# Patient Record
Sex: FEMALE | Race: OTHER | Employment: UNEMPLOYED | URBAN - METROPOLITAN AREA
[De-identification: names, ages, dates, MRNs, and addresses within clinical notes are randomized per-mention and may not be internally consistent; named-entity substitution may affect disease eponyms.]

---

## 2017-10-13 ENCOUNTER — HOSPITAL ENCOUNTER (OUTPATIENT)
Dept: GENERAL RADIOLOGY | Age: 57
Discharge: HOME | End: 2017-10-13
Payer: SUBSIDIZED

## 2017-10-13 DIAGNOSIS — R52 PAIN: ICD-10-CM

## 2017-10-13 PROCEDURE — 73562 X-RAY EXAM OF KNEE 3: CPT

## 2020-01-07 ENCOUNTER — HOSPITAL ENCOUNTER (EMERGENCY)
Age: 60
Discharge: HOME | End: 2020-01-07
Attending: EMERGENCY MEDICINE
Payer: SELF-PAY

## 2020-01-07 ENCOUNTER — APPOINTMENT (OUTPATIENT)
Dept: GENERAL RADIOLOGY | Age: 60
End: 2020-01-07
Attending: EMERGENCY MEDICINE
Payer: SELF-PAY

## 2020-01-07 VITALS
OXYGEN SATURATION: 98 % | TEMPERATURE: 97.7 F | DIASTOLIC BLOOD PRESSURE: 79 MMHG | RESPIRATION RATE: 16 BRPM | SYSTOLIC BLOOD PRESSURE: 139 MMHG | HEIGHT: 60 IN | HEART RATE: 81 BPM

## 2020-01-07 DIAGNOSIS — M54.2 NECK PAIN: Primary | ICD-10-CM

## 2020-01-07 DIAGNOSIS — M25.512 PAIN IN JOINT OF LEFT SHOULDER: ICD-10-CM

## 2020-01-07 DIAGNOSIS — M54.12 CERVICAL RADICULOPATHY: ICD-10-CM

## 2020-01-07 LAB
ALBUMIN SERPL-MCNC: 3.7 G/DL (ref 3.5–5)
ALBUMIN/GLOB SERPL: 1.1 {RATIO} (ref 1.1–2.2)
ALP SERPL-CCNC: 107 U/L (ref 45–117)
ALT SERPL-CCNC: 32 U/L (ref 12–78)
ANION GAP SERPL CALC-SCNC: 4 MMOL/L (ref 5–15)
AST SERPL-CCNC: 17 U/L (ref 15–37)
BASOPHILS # BLD: 0.1 K/UL (ref 0–0.1)
BASOPHILS NFR BLD: 1 % (ref 0–1)
BILIRUB SERPL-MCNC: 0.2 MG/DL (ref 0.2–1)
BUN SERPL-MCNC: 8 MG/DL (ref 6–20)
BUN/CREAT SERPL: 12 (ref 12–20)
CALCIUM SERPL-MCNC: 9.2 MG/DL (ref 8.5–10.1)
CHLORIDE SERPL-SCNC: 109 MMOL/L (ref 97–108)
CO2 SERPL-SCNC: 29 MMOL/L (ref 21–32)
COMMENT, HOLDF: NORMAL
CREAT SERPL-MCNC: 0.65 MG/DL (ref 0.55–1.02)
DIFFERENTIAL METHOD BLD: NORMAL
EOSINOPHIL # BLD: 0.3 K/UL (ref 0–0.4)
EOSINOPHIL NFR BLD: 3 % (ref 0–7)
ERYTHROCYTE [DISTWIDTH] IN BLOOD BY AUTOMATED COUNT: 13.7 % (ref 11.5–14.5)
GLOBULIN SER CALC-MCNC: 3.4 G/DL (ref 2–4)
GLUCOSE SERPL-MCNC: 131 MG/DL (ref 65–100)
HCT VFR BLD AUTO: 37.7 % (ref 35–47)
HGB BLD-MCNC: 12.3 G/DL (ref 11.5–16)
IMM GRANULOCYTES # BLD AUTO: 0 K/UL (ref 0–0.04)
IMM GRANULOCYTES NFR BLD AUTO: 0 % (ref 0–0.5)
LYMPHOCYTES # BLD: 3.2 K/UL (ref 0.8–3.5)
LYMPHOCYTES NFR BLD: 44 % (ref 12–49)
MCH RBC QN AUTO: 28.9 PG (ref 26–34)
MCHC RBC AUTO-ENTMCNC: 32.6 G/DL (ref 30–36.5)
MCV RBC AUTO: 88.7 FL (ref 80–99)
MONOCYTES # BLD: 0.4 K/UL (ref 0–1)
MONOCYTES NFR BLD: 5 % (ref 5–13)
NEUTS SEG # BLD: 3.4 K/UL (ref 1.8–8)
NEUTS SEG NFR BLD: 47 % (ref 32–75)
NRBC # BLD: 0 K/UL (ref 0–0.01)
NRBC BLD-RTO: 0 PER 100 WBC
PLATELET # BLD AUTO: 261 K/UL (ref 150–400)
PMV BLD AUTO: 10.5 FL (ref 8.9–12.9)
POTASSIUM SERPL-SCNC: 3.9 MMOL/L (ref 3.5–5.1)
PROT SERPL-MCNC: 7.1 G/DL (ref 6.4–8.2)
RBC # BLD AUTO: 4.25 M/UL (ref 3.8–5.2)
SAMPLES BEING HELD,HOLD: NORMAL
SODIUM SERPL-SCNC: 142 MMOL/L (ref 136–145)
TROPONIN I SERPL-MCNC: <0.05 NG/ML
WBC # BLD AUTO: 7.3 K/UL (ref 3.6–11)

## 2020-01-07 PROCEDURE — 73030 X-RAY EXAM OF SHOULDER: CPT

## 2020-01-07 PROCEDURE — 80053 COMPREHEN METABOLIC PANEL: CPT

## 2020-01-07 PROCEDURE — 84484 ASSAY OF TROPONIN QUANT: CPT

## 2020-01-07 PROCEDURE — 36415 COLL VENOUS BLD VENIPUNCTURE: CPT

## 2020-01-07 PROCEDURE — 74011250637 HC RX REV CODE- 250/637: Performed by: EMERGENCY MEDICINE

## 2020-01-07 PROCEDURE — 71046 X-RAY EXAM CHEST 2 VIEWS: CPT

## 2020-01-07 PROCEDURE — 99284 EMERGENCY DEPT VISIT MOD MDM: CPT

## 2020-01-07 PROCEDURE — 72050 X-RAY EXAM NECK SPINE 4/5VWS: CPT

## 2020-01-07 PROCEDURE — 93005 ELECTROCARDIOGRAM TRACING: CPT

## 2020-01-07 PROCEDURE — 85025 COMPLETE CBC W/AUTO DIFF WBC: CPT

## 2020-01-07 PROCEDURE — 74011000250 HC RX REV CODE- 250: Performed by: EMERGENCY MEDICINE

## 2020-01-07 RX ORDER — ACETAMINOPHEN 500 MG
1000 TABLET ORAL 3 TIMES DAILY
Qty: 24 TAB | Refills: 0 | Status: SHIPPED | OUTPATIENT
Start: 2020-01-07 | End: 2020-01-11

## 2020-01-07 RX ORDER — LIDOCAINE 4 G/100G
1 PATCH TOPICAL EVERY 24 HOURS
Status: DISCONTINUED | OUTPATIENT
Start: 2020-01-07 | End: 2020-01-08 | Stop reason: HOSPADM

## 2020-01-07 RX ORDER — IBUPROFEN 400 MG/1
400 TABLET ORAL
Status: COMPLETED | OUTPATIENT
Start: 2020-01-07 | End: 2020-01-07

## 2020-01-07 RX ORDER — METHOCARBAMOL 500 MG/1
500 TABLET, FILM COATED ORAL
Qty: 20 TAB | Refills: 0 | Status: SHIPPED | OUTPATIENT
Start: 2020-01-07 | End: 2020-01-12

## 2020-01-07 RX ORDER — LIDOCAINE 50 MG/G
1 PATCH TOPICAL EVERY 24 HOURS
Qty: 5 PATCH | Refills: 0 | Status: SHIPPED | OUTPATIENT
Start: 2020-01-07

## 2020-01-07 RX ORDER — ACETAMINOPHEN 325 MG/1
650 TABLET ORAL
Status: COMPLETED | OUTPATIENT
Start: 2020-01-07 | End: 2020-01-07

## 2020-01-07 RX ADMIN — IBUPROFEN 400 MG: 400 TABLET, FILM COATED ORAL at 23:24

## 2020-01-07 RX ADMIN — ACETAMINOPHEN 650 MG: 325 TABLET ORAL at 23:26

## 2020-01-08 NOTE — ED TRIAGE NOTES
Patient arrives to the ER after being seen at [facility] and being sent to the ER for a further evaluation. Per family patient has been having this pain for 3 days. Patient complaining of left arm pain. Patient denies SOB.

## 2020-01-08 NOTE — ED PROVIDER NOTES
62 y.o. female with past medical history significant for DM, HTN, and hypercholesteremia who presents from home via family members with chief complaint of chest pain. Patient was seen this evening at [facility] for chest pain x 3 days, and was then sent here for further evaluation. Patient complains of chest pain, left shoulder pain, neck pain, and left arm pain at this time. Patient states the pain radiates from her neck, to her arm. Per son, the patient has a hx of arthritis in her left arm and shoulder. Patient states the pain gets better with over the counter \"cream\" applied to her arm. Patient notes she had a stress test completed 3 years ago that was normal. Patient took 650mg of tylenol at 1500. Patient denies any hx of heart problems. Patient also denies any SOB, or nausea. There are no other acute medical concerns at this time. Social hx: (-) Smoker     Note written by Jani Carcamo, as dictated by Ernestine Arreola MD 10:52 PM      The history is provided by the patient. No  was used. No past medical history on file. No past surgical history on file. No family history on file.     Social History     Socioeconomic History    Marital status: SINGLE     Spouse name: Not on file    Number of children: Not on file    Years of education: Not on file    Highest education level: Not on file   Occupational History    Not on file   Social Needs    Financial resource strain: Not on file    Food insecurity:     Worry: Not on file     Inability: Not on file    Transportation needs:     Medical: Not on file     Non-medical: Not on file   Tobacco Use    Smoking status: Not on file   Substance and Sexual Activity    Alcohol use: Not on file    Drug use: Not on file    Sexual activity: Not on file   Lifestyle    Physical activity:     Days per week: Not on file     Minutes per session: Not on file    Stress: Not on file   Relationships    Social connections:     Talks on phone: Not on file     Gets together: Not on file     Attends Anglican service: Not on file     Active member of club or organization: Not on file     Attends meetings of clubs or organizations: Not on file     Relationship status: Not on file    Intimate partner violence:     Fear of current or ex partner: Not on file     Emotionally abused: Not on file     Physically abused: Not on file     Forced sexual activity: Not on file   Other Topics Concern    Not on file   Social History Narrative    Not on file         ALLERGIES: Patient has no known allergies. Review of Systems   Constitutional: Negative for chills and fever. HENT: Negative for congestion and sore throat. Eyes: Negative for pain. Respiratory: Negative for shortness of breath. Cardiovascular: Positive for chest pain. Gastrointestinal: Negative for abdominal pain, diarrhea, nausea and vomiting. Genitourinary: Negative for dysuria and flank pain. Musculoskeletal: Positive for arthralgias, myalgias and neck pain. Negative for back pain. Skin: Negative for rash. Neurological: Negative for dizziness and headaches. Vitals:    01/07/20 2217   BP: 139/79   Pulse: 81   Resp: 16   Temp: 97.7 °F (36.5 °C)   SpO2: 98%   Height: 5' (1.524 m)            Physical Exam  Constitutional:       Appearance: She is well-developed. HENT:      Head: Normocephalic. Eyes:      Conjunctiva/sclera: Conjunctivae normal.   Neck:      Musculoskeletal: Normal range of motion and neck supple. Cardiovascular:      Rate and Rhythm: Normal rate and regular rhythm. Pulses: Normal pulses. Pulmonary:      Effort: Pulmonary effort is normal. No respiratory distress. Breath sounds: Normal breath sounds. Abdominal:      General: Bowel sounds are normal.      Palpations: Abdomen is soft. Tenderness: There is no tenderness. Musculoskeletal: Normal range of motion.       Comments: Pain to the mid scapula and cervical spine, normal strength    Skin:     General: Skin is warm. Capillary Refill: Capillary refill takes less than 2 seconds. Findings: No rash. Neurological:      Mental Status: She is alert and oriented to person, place, and time. Comments: No gross motor or sensory deficits      Note written by Jani Hurley, as dictated by Tricia Ricks MD 10:52 PM      MDM  Number of Diagnoses or Management Options  Cervical radiculopathy:   Neck pain:   Pain in joint of left shoulder:   Diagnosis management comments: Patient presenting with left-sided neck pain/shoulder pain that radiates down the left arm has mild pain in the left upper chest.  Patient reports symptoms have been constant for 3 to 4 days. Denies any trauma or injuries. EKG unremarkable and troponin negative. Unlikely ACS. Chest x-ray negative signs of degenerative changes in the cervical spine. Patient symptoms seem most consistent with a cervical radiculopathy given symptomatic treatment and referral to orthopedics. Amount and/or Complexity of Data Reviewed  Clinical lab tests: reviewed  Tests in the radiology section of CPT®: reviewed      ED Course as of Jan 07 2252 Tue Jan 07, 2020 2252 10:52 PM  EKG: NSR. Rate 67. Normal intervals, normal axis, no ST-elevations or depressions. No signs of ischemia. Interpreted by Fauzia Greene MD          [ZD]      ED Course User Index  [ZD] Tricia Ricks MD       Procedures    Recent Results (from the past 24 hour(s))   SAMPLES BEING HELD    Collection Time: 01/07/20 10:08 PM   Result Value Ref Range    SAMPLES BEING HELD 1SST,1RED,1DRK GRN,1BLU     COMMENT        Add-on orders for these samples will be processed based on acceptable specimen integrity and analyte stability, which may vary by analyte.    CBC WITH AUTOMATED DIFF    Collection Time: 01/07/20 10:08 PM   Result Value Ref Range    WBC 7.3 3.6 - 11.0 K/uL    RBC 4.25 3.80 - 5.20 M/uL    HGB 12.3 11.5 - 16.0 g/dL    HCT 37.7 35.0 - 47.0 %    MCV 88.7 80.0 - 99.0 FL    MCH 28.9 26.0 - 34.0 PG    MCHC 32.6 30.0 - 36.5 g/dL    RDW 13.7 11.5 - 14.5 %    PLATELET 407 070 - 716 K/uL    MPV 10.5 8.9 - 12.9 FL    NRBC 0.0 0  WBC    ABSOLUTE NRBC 0.00 0.00 - 0.01 K/uL    NEUTROPHILS 47 32 - 75 %    LYMPHOCYTES 44 12 - 49 %    MONOCYTES 5 5 - 13 %    EOSINOPHILS 3 0 - 7 %    BASOPHILS 1 0 - 1 %    IMMATURE GRANULOCYTES 0 0.0 - 0.5 %    ABS. NEUTROPHILS 3.4 1.8 - 8.0 K/UL    ABS. LYMPHOCYTES 3.2 0.8 - 3.5 K/UL    ABS. MONOCYTES 0.4 0.0 - 1.0 K/UL    ABS. EOSINOPHILS 0.3 0.0 - 0.4 K/UL    ABS. BASOPHILS 0.1 0.0 - 0.1 K/UL    ABS. IMM. GRANS. 0.0 0.00 - 0.04 K/UL    DF AUTOMATED     METABOLIC PANEL, COMPREHENSIVE    Collection Time: 01/07/20 10:08 PM   Result Value Ref Range    Sodium 142 136 - 145 mmol/L    Potassium 3.9 3.5 - 5.1 mmol/L    Chloride 109 (H) 97 - 108 mmol/L    CO2 29 21 - 32 mmol/L    Anion gap 4 (L) 5 - 15 mmol/L    Glucose 131 (H) 65 - 100 mg/dL    BUN 8 6 - 20 MG/DL    Creatinine 0.65 0.55 - 1.02 MG/DL    BUN/Creatinine ratio 12 12 - 20      GFR est AA >60 >60 ml/min/1.73m2    GFR est non-AA >60 >60 ml/min/1.73m2    Calcium 9.2 8.5 - 10.1 MG/DL    Bilirubin, total 0.2 0.2 - 1.0 MG/DL    ALT (SGPT) 32 12 - 78 U/L    AST (SGOT) 17 15 - 37 U/L    Alk. phosphatase 107 45 - 117 U/L    Protein, total 7.1 6.4 - 8.2 g/dL    Albumin 3.7 3.5 - 5.0 g/dL    Globulin 3.4 2.0 - 4.0 g/dL    A-G Ratio 1.1 1.1 - 2.2     TROPONIN I    Collection Time: 01/07/20 10:08 PM   Result Value Ref Range    Troponin-I, Qt. <0.05 <0.05 ng/mL       Xr Chest Pa Lat    Result Date: 1/7/2020  History: Chest pain. Frontal and lateral views of the chest show clear lungs. There is atherosclerosis of the aorta. The heart, mediastinum and pulmonary vasculature are normal.  There are mild degenerative changes of the spine. There is an old left rib fracture. IMPRESSION: No acute findings.      Xr Spine Cerv 4 Or 5 V    Result Date: 1/7/2020  History: Pain. AP, lateral, swimmer's, bilateral oblique, and odontoid radiographs of the cervical spine demonstrate cervical spine straightening. There is disc space narrowing from C5 through C7. There is uncinate process hypertrophy with narrowing of the right C3-4 and C4-5 neural foramen. The vertebral body heights are maintained. The prevertebral soft tissues appear normal.     IMPRESSION: Multilevel degenerative changes. No acute findings. Xr Shoulder Lt Ap/lat Min 2 V    Result Date: 1/7/2020  EXAM: XR SHOULDER LT AP/LAT MIN 2 V INDICATION: pain. Left shoulder pain COMPARISON: None. FINDINGS: Three views of the left shoulder demonstrate no fracture, dislocation or other acute abnormality. There are degenerative changes of the glenohumeral joint. IMPRESSION: No acute abnormality.

## 2020-01-08 NOTE — DISCHARGE INSTRUCTIONS
Patient Education        Pinzamiento de un nervio en el deborah: Instrucciones de cuidado - [ Salena Minus Nerve in the Neck: Care Instructions ]  Instrucciones de cuidado  Un pinzamiento de un nervio en el deborah ocurre cuando se daña Cory Garth o un disco en la parte superior de la columna vertebral. Kendra daño puede ser consecuencia de usman [place]. O simplemente puede suceder con la edad. Los cambios causados por el daño pueden ejercer presión en [facility] raíz nerviosa cercana y oprimirla. [place] provoca síntomas juan daniel dolor [employer] deborah, el hombro, el brazo, la mano o la espalda. También podría causar hormigueo o entumecimiento. A veces causa debilidad en el brazo. Los síntomas suelen ser peores cuando se gira la mis o se tensiona el deborah. Para [facility], los síntomas mejoran con el tiempo y finalmente desaparecen. El tratamiento temprano suele incluir medicamentos para el dolor y la hinchazón. A veces, la fisioterapia y ejercicios especiales pueden ayudar. La atención de seguimiento es usman parte clave de teran tratamiento y seguridad. Asegúrese de hacer y acudir a todas las citas, y llame a teran médico si está teniendo problemas. También es usman buena idea saber los resultados de polly exámenes y mantener usman lista de los medicamentos que rajendra. ¿Cómo puede cuidarse en el hogar? · Sea giovanni con los medicamentos. Adrienne y siga todas las instrucciones de la [place]. ¨ Si el médico le recetó un analgésico (medicamento para el dolor), tómelo juan daniel se lo indicó. ¨ Si no está tomando un analgésico recetado, pregúntele a teran médico si puede lisseth un medicamento de [facility]. · Trate de utilizar usman almohadilla térmica a baja o media temperatura por entre 15 y 20 minutos cada 2 o 3 horas. Pruebe con [place] ducha tibia en vez de usman sesión con la almohadilla térmica. También puede comprar envolturas calientes (\"heat wraps\") desechables que caballero hasta 8 horas.   · También puede probar con [facility] compresa de hielo por [place] 10 y 15 minutos cada 2 o 3 horas. No hay usman evidencia sólida de que el calor o el hielo ayuden. Galdino usted puede probarlos para alex si le ayudan. · No pase demasiado tiempo en usman chelsea posición. [place] descansos breves para pasear y cambiar la postura. · Use un cinturón de seguridad y un arnés de hombro cuando está en un auto. · Duerma con usman almohada bajo la mis y el deborah que le mantenga el deborah derecho. · Si le dieron un deborah ortopédico (collarín cervical) para restringir la movilidad del deborah, úselo juan daniel se lo indicaron por todos los días que le diga el médico. No lo use carleen más tiempo de lo que le indicaron. Usar un deborah ortopédico por demasiado tiempo puede causar rigidez en el deborah y puede debilitar los músculos del deborah. · Siga las indicaciones de teran médico para hacer ejercicios suaves de estiramiento del deborah. · No fume. Fumar puede retrasar la sanación de [facility] [employer]. Si necesita ayuda para dejar el hábito, hable con teran médico sobre programas y medicamentos para dejar de fumar. Estos pueden aumentar polly probabilidades de dejar de fumar para siempre. · Evite hacer trabajos y ejercicios intensos hasta que teran médico lo apruebe. ¿Cuándo debe pedir ayuda? Llame al 911 en cualquier momento que considere que necesita atención de [place]. Por ejemplo, llame si:  ? · Es completamente incapaz de  un brazo o usman pierna. ?Llame a teran médico ahora mismo o busque atención médica inmediata si:  ? · Usted tiene síntomas nuevos o peores en los brazos, las piernas, el pecho, el abdomen o las nalgas (glúteos). Los síntomas pueden incluir:  ¨ Entumecimiento u hormigueo. ¨ Debilidad. ¨ Dolor. ? · Pierde el control de la vejiga o del intestino. ?Preste especial atención a los cambios en teran santos y asegúrese de comunicarse con teran médico si:  ? · No mejora juan daniel se esperaba. ¿Dónde puede encontrar más información en inglés? Ubaldo Rojas a [email or web address]-dari.[email or web address]   Edu Whaley U901 en la búsqueda para aprender más acerca de \"Pinzamiento de un nervio en el deborah: Instrucciones de cuidado - [ Pinched Nerve in the Neck: Care Instructions ]. \"  Revisado: 21 marzo, 2017  Versión del contenido: 11.5  © 2579-2179 Healthwise, Incorporated. Las instrucciones de cuidado fueron adaptadas bajo licencia por [employer] (which disclaims liability or warranty for this information). Si usted tiene [place] afección médica o sobre estas instrucciones, siempre pregunte a teran profesional de santos. Healthwise, Incorporated niega toda garantía o responsabilidad por teran uso de esta información.

## 2020-01-09 LAB
ATRIAL RATE: 67 BPM
CALCULATED P AXIS, ECG09: 32 DEGREES
CALCULATED R AXIS, ECG10: 50 DEGREES
CALCULATED T AXIS, ECG11: 30 DEGREES
DIAGNOSIS, 93000: NORMAL
P-R INTERVAL, ECG05: 144 MS
Q-T INTERVAL, ECG07: 404 MS
QRS DURATION, ECG06: 76 MS
QTC CALCULATION (BEZET), ECG08: 426 MS
VENTRICULAR RATE, ECG03: 67 BPM

## 2020-01-17 ENCOUNTER — HOSPITAL ENCOUNTER (OUTPATIENT)
Dept: LAB | Age: 60
Discharge: HOME | End: 2020-01-17

## 2020-01-17 PROCEDURE — 87086 URINE CULTURE/COLONY COUNT: CPT

## 2020-01-19 LAB
BACTERIA SPEC CULT: NORMAL
SERVICE CMNT-IMP: NORMAL

## 2020-08-20 ENCOUNTER — HOSPITAL ENCOUNTER (OUTPATIENT)
Dept: LAB | Age: 60
Discharge: HOME | End: 2020-08-20

## 2020-08-20 PROCEDURE — 88175 CYTOPATH C/V AUTO FLUID REDO: CPT

## 2020-08-20 PROCEDURE — 87624 HPV HI-RISK TYP POOLED RSLT: CPT

## 2021-05-28 ENCOUNTER — HOSPITAL ENCOUNTER (OUTPATIENT)
Dept: LAB | Age: 61
Discharge: HOME | End: 2021-05-28

## 2021-05-28 PROCEDURE — 87086 URINE CULTURE/COLONY COUNT: CPT

## 2021-05-30 LAB
BACTERIA SPEC CULT: NORMAL
SERVICE CMNT-IMP: NORMAL

## 2023-10-27 ENCOUNTER — HOSPITAL ENCOUNTER (OUTPATIENT)
Facility: HOSPITAL | Age: 63
Setting detail: SPECIMEN
Discharge: HOME | End: 2023-10-30

## 2023-10-27 PROCEDURE — 87624 HPV HI-RISK TYP POOLED RSLT: CPT

## 2023-10-27 PROCEDURE — 88175 CYTOPATH C/V AUTO FLUID REDO: CPT

## 2024-08-09 ENCOUNTER — HOSPITAL ENCOUNTER (OUTPATIENT)
Facility: HOSPITAL | Age: 64
Setting detail: SPECIMEN
Discharge: HOME | End: 2024-08-12

## 2024-08-09 PROCEDURE — 82570 ASSAY OF URINE CREATININE: CPT

## 2024-08-09 PROCEDURE — 82043 UR ALBUMIN QUANTITATIVE: CPT

## 2024-08-10 LAB
CREAT UR-MCNC: 27.4 MG/DL
MICROALBUMIN UR-MCNC: <0.5 MG/DL
MICROALBUMIN/CREAT UR-RTO: <18 MG/G (ref 0–30)

## 2024-10-16 ENCOUNTER — HOSPITAL ENCOUNTER (OUTPATIENT)
Facility: HOSPITAL | Age: 64
Setting detail: OUTPATIENT SURGERY
Discharge: HOME | End: 2024-10-16
Attending: INTERNAL MEDICINE | Admitting: INTERNAL MEDICINE

## 2024-10-16 ENCOUNTER — ANESTHESIA EVENT (OUTPATIENT)
Facility: HOSPITAL | Age: 64
End: 2024-10-16

## 2024-10-16 ENCOUNTER — ANESTHESIA (OUTPATIENT)
Facility: HOSPITAL | Age: 64
End: 2024-10-16

## 2024-10-16 VITALS
RESPIRATION RATE: 15 BRPM | HEIGHT: 63 IN | WEIGHT: 154.98 LBS | SYSTOLIC BLOOD PRESSURE: 95 MMHG | TEMPERATURE: 98.6 F | DIASTOLIC BLOOD PRESSURE: 49 MMHG | HEART RATE: 61 BPM | BODY MASS INDEX: 27.46 KG/M2 | OXYGEN SATURATION: 100 %

## 2024-10-16 LAB
GLUCOSE BLD STRIP.AUTO-MCNC: 170 MG/DL (ref 65–117)
SERVICE CMNT-IMP: ABNORMAL

## 2024-10-16 PROCEDURE — 7100000010 HC PHASE II RECOVERY - FIRST 15 MIN: Performed by: INTERNAL MEDICINE

## 2024-10-16 PROCEDURE — 6360000002 HC RX W HCPCS: Performed by: NURSE ANESTHETIST, CERTIFIED REGISTERED

## 2024-10-16 PROCEDURE — 2709999900 HC NON-CHARGEABLE SUPPLY: Performed by: INTERNAL MEDICINE

## 2024-10-16 PROCEDURE — 3600007512: Performed by: INTERNAL MEDICINE

## 2024-10-16 PROCEDURE — 3700000000 HC ANESTHESIA ATTENDED CARE: Performed by: INTERNAL MEDICINE

## 2024-10-16 PROCEDURE — 3600007502: Performed by: INTERNAL MEDICINE

## 2024-10-16 PROCEDURE — 88342 IMHCHEM/IMCYTCHM 1ST ANTB: CPT

## 2024-10-16 PROCEDURE — 3700000001 HC ADD 15 MINUTES (ANESTHESIA): Performed by: INTERNAL MEDICINE

## 2024-10-16 PROCEDURE — 82962 GLUCOSE BLOOD TEST: CPT

## 2024-10-16 PROCEDURE — 7100000011 HC PHASE II RECOVERY - ADDTL 15 MIN: Performed by: INTERNAL MEDICINE

## 2024-10-16 PROCEDURE — 88305 TISSUE EXAM BY PATHOLOGIST: CPT

## 2024-10-16 RX ORDER — SODIUM CHLORIDE 0.9 % (FLUSH) 0.9 %
5-40 SYRINGE (ML) INJECTION EVERY 12 HOURS SCHEDULED
Status: DISCONTINUED | OUTPATIENT
Start: 2024-10-16 | End: 2024-10-16 | Stop reason: HOSPADM

## 2024-10-16 RX ORDER — CANAGLIFLOZIN 100 MG/1
TABLET, FILM COATED ORAL
COMMUNITY
Start: 2024-05-09 | End: 2025-02-05

## 2024-10-16 RX ORDER — LIDOCAINE HCL/PF 100 MG/5ML
SYRINGE (ML) INJECTION
Status: DISCONTINUED | OUTPATIENT
Start: 2024-10-16 | End: 2024-10-16 | Stop reason: SDUPTHER

## 2024-10-16 RX ORDER — ATORVASTATIN CALCIUM 10 MG/1
1 TABLET, FILM COATED ORAL
COMMUNITY

## 2024-10-16 RX ORDER — SODIUM CHLORIDE 0.9 % (FLUSH) 0.9 %
5-40 SYRINGE (ML) INJECTION PRN
Status: DISCONTINUED | OUTPATIENT
Start: 2024-10-16 | End: 2024-10-16 | Stop reason: HOSPADM

## 2024-10-16 RX ORDER — LISINOPRIL 10 MG/1
1 TABLET ORAL
COMMUNITY

## 2024-10-16 RX ORDER — PHENYLEPHRINE HYDROCHLORIDE 10 MG/ML
INJECTION INTRAVENOUS
Status: DISCONTINUED | OUTPATIENT
Start: 2024-10-16 | End: 2024-10-16 | Stop reason: SDUPTHER

## 2024-10-16 RX ORDER — METOCLOPRAMIDE 10 MG/1
10 TABLET ORAL
Status: ON HOLD | COMMUNITY
End: 2024-10-16

## 2024-10-16 RX ORDER — SODIUM CHLORIDE 9 MG/ML
INJECTION, SOLUTION INTRAVENOUS PRN
Status: DISCONTINUED | OUTPATIENT
Start: 2024-10-16 | End: 2024-10-16 | Stop reason: HOSPADM

## 2024-10-16 RX ORDER — ASPIRIN 81 MG/1
81 TABLET ORAL DAILY
COMMUNITY

## 2024-10-16 RX ORDER — PROPOFOL 10 MG/ML
INJECTION, EMULSION INTRAVENOUS
Status: DISCONTINUED | OUTPATIENT
Start: 2024-10-16 | End: 2024-10-16 | Stop reason: SDUPTHER

## 2024-10-16 RX ADMIN — LIDOCAINE HYDROCHLORIDE 50 MG: 20 INJECTION INTRAVENOUS at 14:24

## 2024-10-16 RX ADMIN — PHENYLEPHRINE HYDROCHLORIDE 80 MCG: 10 INJECTION INTRAVENOUS at 14:28

## 2024-10-16 RX ADMIN — PROPOFOL 50 MG: 10 INJECTION, EMULSION INTRAVENOUS at 14:24

## 2024-10-16 RX ADMIN — PROPOFOL 150 MCG/KG/MIN: 10 INJECTION, EMULSION INTRAVENOUS at 14:30

## 2024-10-16 RX ADMIN — PHENYLEPHRINE HYDROCHLORIDE 160 MCG: 10 INJECTION INTRAVENOUS at 14:32

## 2024-10-16 RX ADMIN — PHENYLEPHRINE HYDROCHLORIDE 80 MCG: 10 INJECTION INTRAVENOUS at 14:36

## 2024-10-16 ASSESSMENT — PAIN SCALES - GENERAL
PAINLEVEL_OUTOF10: 0

## 2024-10-16 ASSESSMENT — PAIN - FUNCTIONAL ASSESSMENT: PAIN_FUNCTIONAL_ASSESSMENT: 0-10

## 2024-10-16 NOTE — ANESTHESIA POSTPROCEDURE EVALUATION
Department of Anesthesiology  Postprocedure Note    Patient: Taylor Rhodes  MRN: [id number]  YOB: 1960  Date of evaluation: 10/16/2024    Procedure Summary       Date: 10/16/24 Room / Location: [place] [address] / [facility] ENDOSCOPY    Anesthesia Start: 1419 Anesthesia Stop: 1444    Procedures:       COLONOSCOPY DIAGNOSTIC (Lower GI Region)      ESOPHAGOGASTRODUODENOSCOPY (Upper GI Region)      ESOPHAGOGASTRODUODENOSCOPY BIOPSY (Upper GI Region) Diagnosis:       Colon cancer screening      Epigastric pain      (Colon cancer screening [Z12.11])      (Epigastric pain [R10.13])    Surgeons: Yferi Hernandez MD Responsible Provider: Roxie Cardoza MD    Anesthesia Type: MAC ASA Status: 2            Anesthesia Type: No value filed.    Ajit Phase I: Ajit Score: 10    Ajit Phase II: Ajit Score: 10    Anesthesia Post Evaluation    No notable events documented.

## 2024-10-16 NOTE — ANESTHESIA PRE PROCEDURE
Department of Anesthesiology  Preprocedure Note       Name:  Taylor Rhodes   Age:  63 y.o.  :  1960                                          MRN:  [id number]         Date:  10/16/2024      Surgeon: Surgeon(s):  Yefri Hernandez MD    Procedure: Procedure(s):  COLONOSCOPY DIAGNOSTIC  ESOPHAGOGASTRODUODENOSCOPY    Medications prior to admission:   Prior to Admission medications    Medication Sig Start Date End Date Taking? Authorizing Provider   atorvastatin (LIPITOR) 10 MG tablet Take 1 tablet by mouth Every Day   Yes Giovanni Pearson MD   lisinopril (PRINIVIL;ZESTRIL) 10 MG tablet Take 1 tablet by mouth Every Day   Yes Giovanni Pearson MD   metFORMIN (GLUCOPHAGE) 1000 MG tablet 1 tablet   Yes Giovanni Pearson MD   canagliflozin (INVOKANA) 100 MG TABS tablet 1 tablet before the first meal of the day Orally Once a day for 90 days 24 Yes Giovanni Pearson MD   insulin detemir (LEVEMIR) 100 UNIT/ML injection vial Inject into the skin nightly   Yes Giovanni Pearson MD   aspirin 81 MG EC tablet Take 1 tablet by mouth daily   Yes Giovanni Pearson MD   Dulaglutide (TRULICITY) 4.5 MG/0.5ML SOPN 1 injection Subcutaneous weekly for 120 days  25 Yes Giovanni Pearson MD       Current medications:    Current Facility-Administered Medications   Medication Dose Route Frequency Provider Last Rate Last Admin    sodium chloride flush 0.9 % injection 5-40 mL  5-40 mL IntraVENous 2 times per day Yefri Hernandez MD        sodium chloride flush 0.9 % injection 5-40 mL  5-40 mL IntraVENous PRN Yefri Hernandez MD        0.9 % sodium chloride infusion   IntraVENous PRN Yefri Hernandez MD           Allergies:  No Known Allergies    Problem List:  There is no problem list on file for this patient.      Past Medical History:        Diagnosis Date    Depression     Diabetes (HCC)     High cholesterol     Hypertension        Past Surgical History:  History reviewed. No pertinent

## 2024-10-16 NOTE — DISCHARGE INSTRUCTIONS
[facility]  Yefri Hernandez M.D.  [phone]                 COLON and EGD DISCHARGE INSTRUCTIONS    10/16/2024    Taylor Rhodes  :  1960  [facility] Medical Record Number:  [id number]      DISCOMFORT:  Sore throat- throat lozenges or warm salt water gargle  Redness at IV site- apply warm compress to area; if redness or soreness persist- contact your physician  There may be a slight amount of blood passed from the rectum  Gaseous discomfort- walking, belching will help relieve any discomfort  You may not operate a vehicle for 12 hours  You may not engage in an occupation involving machinery or appliances for rest of today  You may not drink alcoholic beverages for at least 12 hours  Avoid making any critical decisions for at least 24 hour  DIET:   Regular diet   - however -  remember your colon is empty and a heavy meal will produce gas.   Avoid these foods:  vegetables, fried / greasy foods, carbonated drinks for today     ACTIVITY:  You may  resume your normal daily activities it is recommended that you spend the remainder of the day resting -  avoid any strenuous activity and driving.    CALL M.DMichelle  ANY SIGN OF:   Increasing pain, nausea, vomiting  Abdominal distension (swelling)  New increased bleeding (oral or rectal)  Fever (chills)  Pain in chest area  Bloody discharge from nose or mouth  Shortness of breath      Follow-up Instructions:   Call Dr. Hernandez if any questions at [phone].  Results of procedure / biopsy in 7 to 10 days, we will call you with these results.  Your endoscopy showed slightly irregular GE junction otherwise unremarkable   Your colonoscopy showed normal colonoscopy.  Repeat colonoscopy in 10 years

## 2024-10-16 NOTE — PROGRESS NOTES
Received recovery report from anesthesia team, see anesthesia note. Abdomen remains soft and non-tender post-procedure. Pt has no complaints at this time and tolerated procedure well. Endoscope was pre-cleaned at the bedside by Tristan Chapman immediately following procedure. Post recovery report given to Aviva Luna.

## 2024-10-16 NOTE — H&P
[facility]  Yefri Hrenandez M.D.  [phone]            History and Physical       NAME:  Taylor Rhodes   :   1960   MRN:   [id number]       Referring Physician:  Rodger Garcia MD      Consult Date: 10/16/2024 1:04 PM    Chief Complaint:  epigastric pain and Colon cancer screening    History of Present Illness:       Taylor Rhodes  Appointment: 2024 10:00 AM  Location: [facility]  Patient #: [id number]  : 1960  Unknown / Language: Other / Race: Refused to Report/Unreported  Female   History of Present Illness (Yefri Hernandez MD; 2024 10:25 AM)   The patient is a 63 year old female who presents with a complaint of Gastritis. Note for \"Gastritis\": Ms. Wiseman is a 63-year-old lady who is being seen today for gastritis    I have reviewed her records.  She has a history of diabetes    States has been having epigastric pain for about 6 months  eating greasy foods will make her pain worse  no imaging has been done  if she avoids greasy foods then her pain is better    denies any nausea or vomiting         PMH:  Past Medical History:   Diagnosis Date    Depression     Diabetes (HCC)     High cholesterol     Hypertension        PSH:  History reviewed. No pertinent surgical history.    Allergies:  No Known Allergies    Home Medications:  Prior to Admission Medications   Prescriptions Last Dose Informant Patient Reported? Taking?   Dulaglutide (TRULICITY) 4.5 MG/0.5ML SOPN 2024  Yes Yes   Si injection Subcutaneous weekly for 120 days   atorvastatin (LIPITOR) 10 MG tablet 10/15/2024  Yes Yes   Sig: Take 1 tablet by mouth Every Day   canagliflozin (INVOKANA) 100 MG TABS tablet 10/15/2024  Yes Yes   Si tablet before the first meal of the day Orally Once a day for 90 days   lisinopril (PRINIVIL;ZESTRIL) 10 MG tablet 10/16/2024  Yes Yes   Sig: Take 1 tablet by mouth Every Day   metFORMIN (GLUCOPHAGE) 1000 MG tablet 10/16/2024  Yes Yes

## 2024-10-16 NOTE — PROCEDURES
[facility]  Yefri Hernandez M.D.  [phone]           10/16/2024                EGD Operative Report  Taylor Rhodes  :  1960  [facility] Medical Record Number:  [id number]      Indication: Epigastric pain    : Yefri Hernandez MD    Assistant -- None  Implants -- None    Referring Provider:  Rodger Garcia MD      Anesthesia/Sedation:  MAC anesthesia Propofol    Airway assessment: No airway problems anticipated    Pre-Procedural Exam:      Airway: clear, no airway problems anticipated  Heart: RRR, without gallops or rubs  Lungs: clear bilaterally without wheezes, crackles, or rhonchi  Abdomen: soft, nontender, nondistended, bowel sounds present  Mental Status: awake, alert and oriented to person, place and time       Procedure Details     After infomed consent was obtained for the procedure, with all risks and benefits of procedure explained the patient was taken to the endoscopy suite and placed in the left lateral decubitus position.  Following sequential administration of sedation as per above, the endoscope was inserted into the mouth and advanced under direct vision to second portion of the duodenum.  A careful inspection was made as the gastroscope was withdrawn, including a retroflexed view of the proximal stomach; findings and interventions are described below.      Findings:   Esophagus:normal other than a slightly irregular Z-line  Stomach: normal   Duodenum/jejunum: normal    Therapies:  biopsy of stomach  rule out H. pylori  biopsy of GE junction    Specimens:  As above           Complications:   None; patient tolerated the procedure well.    EBL:  None.           Impression:   Slight irregularity of the GE junction otherwise unremarkable    Recommendations:    -Await pathology.  -Proceed with colonoscopy today as planned    Yefri Hernandez MD

## 2024-10-16 NOTE — PROCEDURES
Former[facility]  Yefri Hernandez M.D.  [phone]            10/16/2024          Colonoscopy Operative Report  Taylor Rhodes  :  1960  [facility] Medical Record Number:  [id number]      Indications:    Screening colonoscopy     :  Yefri Hernandez MD    Assistant -- None  Implants -- None    Referring Provider: Rodger Garcia MD    Sedation:  MAC anesthesia Propofol    Pre-Procedural Exam:      Airway: clear,  No airway problems anticipated  Heart: RRR, without gallops or rubs  Lungs: clear bilaterally without wheezes, crackles, or rhonchi  Abdomen: soft, nontender, nondistended, bowel sounds present  Mental Status: awake, alert and oriented to person, place and time     Procedure Details:  After informed consent was obtained with all risks and benefits of procedure explained and preoperative exam completed, the patient was taken to the endoscopy suite and placed in the left lateral decubitus position.  Upon sequential sedation as per above, a digital rectal exam was performed. The Olympus videocolonoscope  was inserted in the rectum and carefully advanced to the terminal ileum.  The quality of preparation was good.  The colonoscope was slowly withdrawn with careful inspection and evaluation between folds. Retroflexion in the rectum was performed.    Findings:   Terminal Ileum: normal  Cecum: normal  Ascending Colon: normal  Transverse Colon: normal  Descending Colon: normal  Sigmoid: normal  Rectum: normal    Interventions:  none    Specimen Removed:  none    Complications: None.     EBL:  None.    Impression: Normal exam    Recommendations:  -Repeat colonoscopy in 10 years     Discharge Disposition:  Home in the company of a  when able to ambulate.    Yefri Hernandez MD  10/16/2024  2:43 PM

## 2024-11-22 ENCOUNTER — HOSPITAL ENCOUNTER (OUTPATIENT)
Facility: HOSPITAL | Age: 64
Setting detail: SPECIMEN
Discharge: HOME | End: 2024-11-25

## 2024-11-22 PROCEDURE — 87798 DETECT AGENT NOS DNA AMP: CPT

## 2024-11-22 PROCEDURE — 87491 CHLMYD TRACH DNA AMP PROBE: CPT

## 2024-11-22 PROCEDURE — 87801 DETECT AGNT MULT DNA AMPLI: CPT

## 2024-11-22 PROCEDURE — 87147 CULTURE TYPE IMMUNOLOGIC: CPT

## 2024-11-22 PROCEDURE — 87086 URINE CULTURE/COLONY COUNT: CPT

## 2024-11-22 PROCEDURE — 87591 N.GONORRHOEAE DNA AMP PROB: CPT

## 2024-11-22 PROCEDURE — 87661 TRICHOMONAS VAGINALIS AMPLIF: CPT

## 2024-11-24 LAB
BACTERIA SPEC CULT: ABNORMAL
CC UR VC: ABNORMAL
SERVICE CMNT-IMP: ABNORMAL

## 2024-11-27 LAB
A VAGINAE DNA VAG QL NAA+PROBE: NORMAL SCORE
BVAB2 DNA VAG QL NAA+PROBE: NORMAL SCORE
C ALBICANS DNA VAG QL NAA+PROBE: NEGATIVE
C GLABRATA DNA VAG QL NAA+PROBE: NEGATIVE
C TRACH RRNA SPEC QL NAA+PROBE: NEGATIVE
CANDIDA KRUSEI: NEGATIVE
CANDIDA LUSITANIAE, NAA: NEGATIVE
CANDIDA PARAPSILOSIS/TROPICALIS: NEGATIVE
MEGA1 DNA VAG QL NAA+PROBE: NORMAL SCORE
N GONORRHOEA RRNA SPEC QL NAA+PROBE: NEGATIVE
T VAGINALIS RRNA SPEC QL NAA+PROBE: NEGATIVE

## (undated) DEVICE — CONTAINER SPEC 20 ML LID NEUT BUFF FORMALIN 10 % POLYPR STS

## (undated) DEVICE — BLUNTFILL WITH FILTER: Brand: MONOJECT

## (undated) DEVICE — CATHETER IV 22GA L1IN OD0.8382-0.9144MM ID0.6096-0.6858MM 382523

## (undated) DEVICE — SOLIDIFIER FLD 2OZ 1500CC N DISINF IN BTL DISP SAFESORB

## (undated) DEVICE — SYRINGE MED 5ML STD CLR PLAS LUERLOCK TIP N CTRL DISP

## (undated) DEVICE — 3M™ CUROS™ DISINFECTING CAP FOR NEEDLELESS CONNECTORS 270/CARTON 20 CARTONS/CASE CFF1-270: Brand: CUROS™

## (undated) DEVICE — 1200 GUARD II KIT W/5MM TUBE W/O VAC TUBE: Brand: GUARDIAN

## (undated) DEVICE — ELECTRODE,RADIOTRANSLUCENT,FOAM,3PK: Brand: MEDLINE

## (undated) DEVICE — CANNULA CUSH AD W/ 14FT TBG

## (undated) DEVICE — BLUNTFILL: Brand: MONOJECT

## (undated) DEVICE — BITEBLOCK ENDOSCP 60FR MAXI WHT POLYETH STURDY W/ VELC WVN

## (undated) DEVICE — IV STRT KT 3282] LSL INDUSTRIES INC]

## (undated) DEVICE — KIT COLON W/ 1.1OZ LUB AND 2 END

## (undated) DEVICE — SYRINGE MEDICAL 3ML CLEAR PLASTIC STANDARD NON CONTROL LUERLOCK TIP DISPOSABLE

## (undated) DEVICE — SET ADMIN 16ML TBNG L100IN 2 Y INJ SITE IV PIGGY BK DISP (ORDER IN MULIPLES OF 48)